# Patient Record
Sex: FEMALE | Race: WHITE
[De-identification: names, ages, dates, MRNs, and addresses within clinical notes are randomized per-mention and may not be internally consistent; named-entity substitution may affect disease eponyms.]

---

## 2018-05-20 ENCOUNTER — HOSPITAL ENCOUNTER (EMERGENCY)
Dept: HOSPITAL 17 - NEPD | Age: 33
Discharge: HOME | End: 2018-05-20
Payer: SELF-PAY

## 2018-05-20 VITALS
RESPIRATION RATE: 18 BRPM | DIASTOLIC BLOOD PRESSURE: 97 MMHG | OXYGEN SATURATION: 98 % | SYSTOLIC BLOOD PRESSURE: 141 MMHG | HEART RATE: 86 BPM | TEMPERATURE: 98.9 F

## 2018-05-20 VITALS — BODY MASS INDEX: 23.9 KG/M2 | WEIGHT: 139.99 LBS | HEIGHT: 64 IN

## 2018-05-20 DIAGNOSIS — I10: ICD-10-CM

## 2018-05-20 DIAGNOSIS — N93.9: Primary | ICD-10-CM

## 2018-05-20 LAB
ALBUMIN SERPL-MCNC: 3.6 GM/DL (ref 3.4–5)
ALP SERPL-CCNC: 111 U/L (ref 45–117)
ALT SERPL-CCNC: 40 U/L (ref 10–53)
AST SERPL-CCNC: 40 U/L (ref 15–37)
BASOPHILS # BLD AUTO: 0.1 TH/MM3 (ref 0–0.2)
BASOPHILS NFR BLD: 1.1 % (ref 0–2)
BILIRUB SERPL-MCNC: 0.4 MG/DL (ref 0.2–1)
BUN SERPL-MCNC: 16 MG/DL (ref 7–18)
CALCIUM SERPL-MCNC: 8.2 MG/DL (ref 8.5–10.1)
CHLORIDE SERPL-SCNC: 100 MEQ/L (ref 98–107)
COLOR UR: (no result)
CREAT SERPL-MCNC: 0.98 MG/DL (ref 0.5–1)
EOSINOPHIL # BLD: 0.2 TH/MM3 (ref 0–0.4)
EOSINOPHIL NFR BLD: 2.5 % (ref 0–4)
ERYTHROCYTE [DISTWIDTH] IN BLOOD BY AUTOMATED COUNT: 12.7 % (ref 11.6–17.2)
GFR SERPLBLD BASED ON 1.73 SQ M-ARVRAT: 65 ML/MIN (ref 89–?)
GLUCOSE SERPL-MCNC: 98 MG/DL (ref 74–106)
GLUCOSE UR STRIP-MCNC: (no result) MG/DL
HCO3 BLD-SCNC: 31.1 MEQ/L (ref 21–32)
HCT VFR BLD CALC: 41.1 % (ref 35–46)
HGB BLD-MCNC: 14.5 GM/DL (ref 11.6–15.3)
HGB UR QL STRIP: (no result)
KETONES UR STRIP-MCNC: (no result) MG/DL
LYMPHOCYTES # BLD AUTO: 2.5 TH/MM3 (ref 1–4.8)
LYMPHOCYTES NFR BLD AUTO: 39.6 % (ref 9–44)
MCH RBC QN AUTO: 32 PG (ref 27–34)
MCHC RBC AUTO-ENTMCNC: 35.2 % (ref 32–36)
MCV RBC AUTO: 90.8 FL (ref 80–100)
MONOCYTE #: 0.5 TH/MM3 (ref 0–0.9)
MONOCYTES NFR BLD: 7.3 % (ref 0–8)
NEUTROPHILS # BLD AUTO: 3.2 TH/MM3 (ref 1.8–7.7)
NEUTROPHILS NFR BLD AUTO: 49.5 % (ref 16–70)
NITRITE UR QL STRIP: (no result)
PLATELET # BLD: 261 TH/MM3 (ref 150–450)
PMV BLD AUTO: 9.1 FL (ref 7–11)
PROT SERPL-MCNC: 7.2 GM/DL (ref 6.4–8.2)
RBC # BLD AUTO: 4.53 MIL/MM3 (ref 4–5.3)
SODIUM SERPL-SCNC: 137 MEQ/L (ref 136–145)
SP GR UR STRIP: 1.02 (ref 1–1.03)
URINE LEUKOCYTE ESTERASE: (no result)
WBC # BLD AUTO: 6.4 TH/MM3 (ref 4–11)

## 2018-05-20 PROCEDURE — 99284 EMERGENCY DEPT VISIT MOD MDM: CPT

## 2018-05-20 PROCEDURE — 76856 US EXAM PELVIC COMPLETE: CPT

## 2018-05-20 PROCEDURE — 93975 VASCULAR STUDY: CPT

## 2018-05-20 PROCEDURE — 80053 COMPREHEN METABOLIC PANEL: CPT

## 2018-05-20 PROCEDURE — 81001 URINALYSIS AUTO W/SCOPE: CPT

## 2018-05-20 PROCEDURE — 76830 TRANSVAGINAL US NON-OB: CPT

## 2018-05-20 PROCEDURE — 85025 COMPLETE CBC W/AUTO DIFF WBC: CPT

## 2018-05-20 PROCEDURE — 84702 CHORIONIC GONADOTROPIN TEST: CPT

## 2018-05-20 NOTE — PD
HPI


Chief Complaint:  Gyn Problem/Complaint


Time Seen by Provider:  18:51


Travel History


International Travel<30 days:  No


Contact w/Intl Traveler<30days:  No


Traveled to known affect area:  No





History of Present Illness


HPI


32-year-old female arrives with complaint of vaginal bleeding for approximately 

1 day.  She woke up with vaginal bleeding.  She reports using multiple super 

max tampons every hour or so.  She reports large clots of blood that she can 

feel passing.  No similar prior episode has occurred.  No headache dizziness 

palpitations or chest pain.  No shortness of breath.  No fever.  No abdominal 

pelvic discomfort reported.  Timing continuous.





PFSH


Past Medical History


Hypertension:  Yes


Tetanus Vaccination:  < 5 Years


Pregnant?:  Unknown


LMP:  5/16/18





Social History


Alcohol Use:  Yes


Tobacco Use:  No


Substance Use:  No





Allergies-Medications


(Allergen,Severity, Reaction):  


Coded Allergies:  


     No Known Allergies (Unverified , 5/20/18)


Reported Meds & Prescriptions





Reported Meds & Active Scripts


Active


Medroxyprogesterone Acetate 5 Mg Tab 5 Mg PO DAILY


     Start day 16


Reported


Metoprolol Tartrate 50 Mg Tab 50 Mg PO DAILY


Hydrochlorothiazide 25 Mg Tab 25 Mg PO DAILY








Review of Systems


Except as stated in HPI:  all other systems reviewed are Neg


General / Constitutional:  No: Chills





Physical Exam


Narrative


GENERAL: 32-year-old female pleasant well-nourished well-developed





Vital Signs








  Date Time  Temp Pulse Resp B/P (MAP) Pulse Ox O2 Delivery O2 Flow Rate FiO2


 


5/20/18 18:35 98.9 86 18 141/97 (112) 98   








SKIN: Warm and dry.


HEAD: Atraumatic. Normocephalic. 


EYES: Pupils equal and round. No scleral icterus. No injection or drainage. 


ENT: No nasal bleeding or discharge.  Mucous membranes pink and moist.


NECK: Trachea midline. No JVD. 


CARDIOVASCULAR: Regular rate and rhythm.  


RESPIRATORY: No accessory muscle use. Clear to auscultation. Breath sounds 

equal bilaterally. 


GASTROINTESTINAL: Abdomen soft, non-tender, nondistended. Hepatic and splenic 

margins not palpable. 


MUSCULOSKELETAL: Extremities without clubbing, cyanosis, or edema. No obvious 

deformities. 


NEUROLOGICAL: Awake and alert. No obvious cranial nerve deficits.  Motor 

grossly within normal limits. Five out of 5 muscle strength in the arms and 

legs.  Normal speech.


PSYCHIATRIC: Appropriate mood and affect; insight and judgment normal.





Data


Data


Last Documented VS





Vital Signs








  Date Time  Temp Pulse Resp B/P (MAP) Pulse Ox O2 Delivery O2 Flow Rate FiO2


 


5/20/18 18:35 98.9 86 18 141/97 (112) 98   








Orders





 Orders


Beta Hcg (Quant/Titer) (5/20/18 19:16)


Complete Blood Count With Diff (5/20/18 19:16)


Comprehensive Metabolic Panel (5/20/18 19:16)


Urinalysis - C+S If Indicated (5/20/18 19:16)


Iv Access Insert/Monitor (5/20/18 19:16)


Ecg Monitoring (5/20/18 19:16)


Us Pelvis Comp W Dop Transvag (5/20/18 )


Ed Discharge Order (5/20/18 21:26)





Labs





Laboratory Tests








Test


  5/20/18


20:32 5/20/18


20:35


 


White Blood Count 6.4 TH/MM3  


 


Red Blood Count 4.53 MIL/MM3  


 


Hemoglobin 14.5 GM/DL  


 


Hematocrit 41.1 %  


 


Mean Corpuscular Volume 90.8 FL  


 


Mean Corpuscular Hemoglobin 32.0 PG  


 


Mean Corpuscular Hemoglobin


Concent 35.2 % 


  


 


 


Red Cell Distribution Width 12.7 %  


 


Platelet Count 261 TH/MM3  


 


Mean Platelet Volume 9.1 FL  


 


Neutrophils (%) (Auto) 49.5 %  


 


Lymphocytes (%) (Auto) 39.6 %  


 


Monocytes (%) (Auto) 7.3 %  


 


Eosinophils (%) (Auto) 2.5 %  


 


Basophils (%) (Auto) 1.1 %  


 


Neutrophils # (Auto) 3.2 TH/MM3  


 


Lymphocytes # (Auto) 2.5 TH/MM3  


 


Monocytes # (Auto) 0.5 TH/MM3  


 


Eosinophils # (Auto) 0.2 TH/MM3  


 


Basophils # (Auto) 0.1 TH/MM3  


 


CBC Comment DIFF FINAL  


 


Differential Comment   


 


Blood Urea Nitrogen 16 MG/DL  


 


Creatinine 0.98 MG/DL  


 


Random Glucose 98 MG/DL  


 


Total Protein 7.2 GM/DL  


 


Albumin 3.6 GM/DL  


 


Calcium Level 8.2 MG/DL  


 


Alkaline Phosphatase 111 U/L  


 


Aspartate Amino Transf


(AST/SGOT) 40 U/L 


  


 


 


Alanine Aminotransferase


(ALT/SGPT) 40 U/L 


  


 


 


Total Bilirubin 0.4 MG/DL  


 


Sodium Level 137 MEQ/L  


 


Potassium Level 4.0 MEQ/L  


 


Chloride Level 100 MEQ/L  


 


Carbon Dioxide Level 31.1 MEQ/L  


 


Anion Gap 6 MEQ/L  


 


Estimat Glomerular Filtration


Rate 65 ML/MIN 


  


 


 


Human Chorionic Gonadotropin,


Quant LESS THAN 1


MIU/ML 


 


 


Urine Color  LIGHT-YELLOW 


 


Urine Turbidity  CLEAR 


 


Urine pH  6.5 


 


Urine Specific Gravity  1.016 


 


Urine Protein  NEG mg/dL 


 


Urine Glucose (UA)  NEG mg/dL 


 


Urine Ketones  NEG mg/dL 


 


Urine Occult Blood  MOD 


 


Urine Nitrite  NEG 


 


Urine Bilirubin  NEG 


 


Urine Urobilinogen


  


  LESS THAN 2.0


MG/DL


 


Urine Leukocyte Esterase  NEG 


 


Urine RBC  3 /hpf 


 


Urine WBC


  


  LESS THAN 1


/hpf


 


Microscopic Urinalysis Comment


  


  CULT NOT


INDICATED











MDM


Medical Decision Making


Medical Screen Exam Complete:  Yes


Emergency Medical Condition:  Yes


Medical Record Reviewed:  Yes


Differential Diagnosis


Anemia, uterine mass, UTI


Narrative Course


CBC & BMP Diagram


5/20/18 20:32








Total Protein 7.2, Albumin 3.6, Calcium Level 8.2 L, Alkaline Phosphatase 111, 

Aspartate Amino Transf (AST/SGOT) 40 H, Alanine Aminotransferase (ALT/SGPT) 40, 

Total Bilirubin 0.4


Urinalysis shows no UTI





Vital Signs








  Date Time  Temp Pulse Resp B/P (MAP) Pulse Ox O2 Delivery O2 Flow Rate FiO2


 


5/20/18 18:35 98.9 86 18 141/97 (112) 98   





Patient is hemodynamically normal.


The patient is resting comfortably and feels better, is alert and in no 

distress the time of reassessment, 9:35 PM. The patients results and 

examination findings were discussed.  The repeat examination is unremarkable 

and benign. The history, exam, diagnostic testing, and current condition do not 

suggest any significant pathology to warrant further testing, continued ED 

treatment, admission, or surgical evaluation at this point. The vital signs 

have been stable. The patient does not have uncontrollable pain, intractable 

vomiting, or other significant symptoms. The patient's condition is stable and 

appropriate for discharge. The patient will pursue further outpatient 

evaluation with a primary care physician or other designated or consulting 

physician as indicated in the discharge instructions. The patient expressed 

understanding and was agreeable with this plan.





Diagnosis





 Primary Impression:  


 Vaginal bleeding


Referrals:  


Gynecologist


call for appointment


***Med/Other Pt SpecificInfo:  Prescription(s) given


Scripts


Medroxyprogesterone Acetate (Medroxyprogesterone Acetate) 5 Mg Tab


5 MG PO DAILY for Uterine bleeding, #10 TAB 0 Refills


   Start day 16


   Prov: Jostin Saldivar MD         5/20/18


Disposition:  01 DISCHARGE HOME


Condition:  Stable











Jostin Saldivar MD May 20, 2018 21:26

## 2018-05-20 NOTE — RADRPT
EXAM DATE/TIME:  05/20/2018 20:29 

 

[facility] COMPARISON:     

No previous studies available for comparison.

        

 

 

INDICATIONS :     

Pelvic pain. 

                     

 

MEDICAL HISTORY :     

Hypertension.     Pelvic pain. Alcohol use.

 

SURGICAL HISTORY :          

D&C. 

 

ENCOUNTER:     

Initial

 

ACUITY:     

1 day

 

PAIN SCORE:     

6/10

 

LOCATION:     

Bilateral pelvis 

MEASUREMENTS:     

                                                        

 

UTERUS:                                  

8.6 x 3.7 x 3.3 cm 

 

ENDOMETRIAL STRIPE:      

4 mm

 

RIGHT OVARY:                      

2.4 x 2.6 x 1.0 cm     

 

LEFT OVARY:                         

n/a. cm     

 

FINDINGS:     

 

UTERUS:     

Small cystic lesions measuring up to 5 mm in the lower uterine segment might reflect nabothian cysts.
 No definite myometrial mass.

 

RIGHT OVARY:     

Ovary contains no mass or significant cystic lesion.  

 

LEFT OVARY:     

Unable to visualize.

MISCELLANEOUS:     No free fluid.  

 

CONCLUSION:     

1. Left ovary is not visualized.

2. Normal appearing right ovary.

3. Probable small nabothian cysts. 

 

 

 Manjit Mata MD on May 20, 2018 at 21:17           

Board Certified Radiologist.

 This report was verified electronically.

## 2018-05-24 ENCOUNTER — HOSPITAL ENCOUNTER (EMERGENCY)
Dept: HOSPITAL 17 - NEPD | Age: 33
Discharge: HOME | End: 2018-05-24
Payer: SELF-PAY

## 2018-05-24 VITALS
HEART RATE: 82 BPM | RESPIRATION RATE: 22 BRPM | SYSTOLIC BLOOD PRESSURE: 135 MMHG | DIASTOLIC BLOOD PRESSURE: 67 MMHG | TEMPERATURE: 98.1 F | OXYGEN SATURATION: 100 %

## 2018-05-24 VITALS — DIASTOLIC BLOOD PRESSURE: 55 MMHG | SYSTOLIC BLOOD PRESSURE: 120 MMHG

## 2018-05-24 VITALS — WEIGHT: 145.51 LBS | HEIGHT: 64 IN | BODY MASS INDEX: 24.84 KG/M2

## 2018-05-24 DIAGNOSIS — Z79.899: ICD-10-CM

## 2018-05-24 DIAGNOSIS — N93.8: Primary | ICD-10-CM

## 2018-05-24 DIAGNOSIS — I10: ICD-10-CM

## 2018-05-24 LAB
ALBUMIN SERPL-MCNC: 3.7 GM/DL (ref 3.4–5)
ALP SERPL-CCNC: 84 U/L (ref 45–117)
ALT SERPL-CCNC: 41 U/L (ref 10–53)
AST SERPL-CCNC: 30 U/L (ref 15–37)
BACTERIA #/AREA URNS HPF: (no result) /HPF
BASOPHILS # BLD AUTO: 0.1 TH/MM3 (ref 0–0.2)
BASOPHILS NFR BLD: 0.9 % (ref 0–2)
BILIRUB SERPL-MCNC: 0.2 MG/DL (ref 0.2–1)
BUN SERPL-MCNC: 14 MG/DL (ref 7–18)
CALCIUM SERPL-MCNC: 8.7 MG/DL (ref 8.5–10.1)
CHLORIDE SERPL-SCNC: 101 MEQ/L (ref 98–107)
COLOR UR: (no result)
CREAT SERPL-MCNC: 0.92 MG/DL (ref 0.5–1)
EOSINOPHIL # BLD: 0.1 TH/MM3 (ref 0–0.4)
EOSINOPHIL NFR BLD: 2.2 % (ref 0–4)
ERYTHROCYTE [DISTWIDTH] IN BLOOD BY AUTOMATED COUNT: 12.6 % (ref 11.6–17.2)
GFR SERPLBLD BASED ON 1.73 SQ M-ARVRAT: 70 ML/MIN (ref 89–?)
GLUCOSE SERPL-MCNC: 94 MG/DL (ref 74–106)
GLUCOSE UR STRIP-MCNC: (no result) MG/DL
HCO3 BLD-SCNC: 31.9 MEQ/L (ref 21–32)
HCT VFR BLD CALC: 39.6 % (ref 35–46)
HGB BLD-MCNC: 13.8 GM/DL (ref 11.6–15.3)
HGB UR QL STRIP: (no result)
KETONES UR STRIP-MCNC: (no result) MG/DL
LYMPHOCYTES # BLD AUTO: 1.7 TH/MM3 (ref 1–4.8)
LYMPHOCYTES NFR BLD AUTO: 30.9 % (ref 9–44)
MCH RBC QN AUTO: 31.8 PG (ref 27–34)
MCHC RBC AUTO-ENTMCNC: 34.8 % (ref 32–36)
MCV RBC AUTO: 91.4 FL (ref 80–100)
MONOCYTE #: 0.4 TH/MM3 (ref 0–0.9)
MONOCYTES NFR BLD: 7.6 % (ref 0–8)
NEUTROPHILS # BLD AUTO: 3.3 TH/MM3 (ref 1.8–7.7)
NEUTROPHILS NFR BLD AUTO: 58.4 % (ref 16–70)
NITRITE UR QL STRIP: (no result)
PLATELET # BLD: 230 TH/MM3 (ref 150–450)
PMV BLD AUTO: 9 FL (ref 7–11)
PROT SERPL-MCNC: 7.3 GM/DL (ref 6.4–8.2)
RBC # BLD AUTO: 4.33 MIL/MM3 (ref 4–5.3)
SODIUM SERPL-SCNC: 141 MEQ/L (ref 136–145)
SP GR UR STRIP: 1.02 (ref 1–1.03)
SQUAMOUS #/AREA URNS HPF: 93 /HPF (ref 0–5)
URINE LEUKOCYTE ESTERASE: (no result)
WBC # BLD AUTO: 5.6 TH/MM3 (ref 4–11)

## 2018-05-24 PROCEDURE — 84703 CHORIONIC GONADOTROPIN ASSAY: CPT

## 2018-05-24 PROCEDURE — 80053 COMPREHEN METABOLIC PANEL: CPT

## 2018-05-24 PROCEDURE — 85025 COMPLETE CBC W/AUTO DIFF WBC: CPT

## 2018-05-24 PROCEDURE — 87086 URINE CULTURE/COLONY COUNT: CPT

## 2018-05-24 PROCEDURE — 99283 EMERGENCY DEPT VISIT LOW MDM: CPT

## 2018-05-24 PROCEDURE — 81001 URINALYSIS AUTO W/SCOPE: CPT

## 2018-05-24 NOTE — PD
HPI


Chief Complaint:  Bleeding


Time Seen by Provider:  13:21


Travel History


International Travel<30 days:  No


Contact w/Intl Traveler<30days:  No


Traveled to known affect area:  No





History of Present Illness


HPI


Patient is a 32-year-old female  presents emergency department for 

evaluation of continued vaginal bleeding.  Patient was here 2 days ago for 

similar, she was started on medroxyprogesterone for her vaginal bleeding and 

she states that it is making her dizzy and not relieving her bleeding at all.  

She states she has also been passing large clots.  States she had an ultrasound 

here 2 days ago which was negative, she is from out of town so has not followed 

up with an OB/GYN as of yet.  No fevers no abdominal pain no nausea no 

vomiting.  States symptoms are moderate, for the past week, constant, moderate.





PFSH


Past Medical History


Cardiovascular Problems:  Yes


Hypertension:  Yes


Tetanus Vaccination:  Unknown


Influenza Vaccination:  No


Pregnant?:  Not Pregnant


LMP:  18





Social History


Alcohol Use:  Yes


Tobacco Use:  No


Substance Use:  No





Allergies-Medications


(Allergen,Severity, Reaction):  


Coded Allergies:  


     No Known Allergies (Unverified , 18)


Reported Meds & Prescriptions





Reported Meds & Active Scripts


Active


Medroxyprogesterone Acetate 5 Mg Tab 5 Mg PO DAILY


     Start day 16


Reported


Metoprolol Tartrate 50 Mg Tab 50 Mg PO DAILY


Hydrochlorothiazide 25 Mg Tab 25 Mg PO DAILY








Review of Systems


Except as stated in HPI:  all other systems reviewed are Neg





Physical Exam


Narrative


GENERAL: Well-developed well-nourished in no obvious distress peer


SKIN: Focused skin assessment warm/dry.


HEAD: Atraumatic. Normocephalic. 


EYES: Pupils equal and round. No scleral icterus. No injection or drainage.  No 

conjunctival pallor


ENT: No nasal bleeding or discharge.  Mucous membranes pink and moist.


NECK: Trachea midline. No JVD. 


CARDIOVASCULAR: Regular rate and rhythm.  No murmur appreciated.


RESPIRATORY: No accessory muscle use. Clear to auscultation. Breath sounds 

equal bilaterally. 


GASTROINTESTINAL: Abdomen soft, non-tender, nondistended. Hepatic and splenic 

margins not palpable.


GENITOURINARY: Declined by patient


MUSCULOSKELETAL: No obvious deformities. No clubbing.  No cyanosis.  No edema. 


NEUROLOGICAL: Awake and alert. No obvious cranial nerve deficits.  Motor 

grossly within normal limits. Normal speech.


PSYCHIATRIC: Appropriate mood and affect; insight and judgment normal.





Data


Data


Last Documented VS





Vital Signs








  Date Time  Temp Pulse Resp B/P (MAP) Pulse Ox O2 Delivery O2 Flow Rate FiO2


 


18 14:01  80 16 120/55 (76) 100   


 


18 13:15      Room Air  


 


18 11:45 98.1       








Orders





 Orders


Complete Blood Count With Diff (18 11:47)


Comprehensive Metabolic Panel (18 11:47)


Urinalysis - C+S If Indicated (18 11:47)


Ed Urine Pregnancytest Poc (18 11:47)


Urine Culture (18 12:05)


Ed Discharge Order (18 13:30)





Labs





Laboratory Tests








Test


  18


12:05 18


12:23


 


Urine Color LIGHT-RED  


 


Urine Turbidity CLOUDY  


 


Urine pH 7.0  


 


Urine Specific Gravity 1.023  


 


Urine Protein 100 mg/dL  


 


Urine Glucose (UA) NEG mg/dL  


 


Urine Ketones NEG mg/dL  


 


Urine Occult Blood LARGE  


 


Urine Nitrite NEG  


 


Urine Bilirubin NEG  


 


Urine Urobilinogen


  LESS THAN 2.0


MG/DL 


 


 


Urine Leukocyte Esterase NEG  


 


Urine RBC  /hpf  


 


Urine WBC 18 /hpf  


 


Urine Squamous Epithelial


Cells 93 /hpf 


  


 


 


Urine Bacteria OCC /hpf  


 


Microscopic Urinalysis Comment


  CULTURE


INDICATED 


 


 


White Blood Count  5.6 TH/MM3 


 


Red Blood Count  4.33 MIL/MM3 


 


Hemoglobin  13.8 GM/DL 


 


Hematocrit  39.6 % 


 


Mean Corpuscular Volume  91.4 FL 


 


Mean Corpuscular Hemoglobin  31.8 PG 


 


Mean Corpuscular Hemoglobin


Concent 


  34.8 % 


 


 


Red Cell Distribution Width  12.6 % 


 


Platelet Count  230 TH/MM3 


 


Mean Platelet Volume  9.0 FL 


 


Neutrophils (%) (Auto)  58.4 % 


 


Lymphocytes (%) (Auto)  30.9 % 


 


Monocytes (%) (Auto)  7.6 % 


 


Eosinophils (%) (Auto)  2.2 % 


 


Basophils (%) (Auto)  0.9 % 


 


Neutrophils # (Auto)  3.3 TH/MM3 


 


Lymphocytes # (Auto)  1.7 TH/MM3 


 


Monocytes # (Auto)  0.4 TH/MM3 


 


Eosinophils # (Auto)  0.1 TH/MM3 


 


Basophils # (Auto)  0.1 TH/MM3 


 


CBC Comment  DIFF FINAL 


 


Differential Comment   


 


Blood Urea Nitrogen  14 MG/DL 


 


Creatinine  0.92 MG/DL 


 


Random Glucose  94 MG/DL 


 


Total Protein  7.3 GM/DL 


 


Albumin  3.7 GM/DL 


 


Calcium Level  8.7 MG/DL 


 


Alkaline Phosphatase  84 U/L 


 


Aspartate Amino Transf


(AST/SGOT) 


  30 U/L 


 


 


Alanine Aminotransferase


(ALT/SGPT) 


  41 U/L 


 


 


Total Bilirubin  0.2 MG/DL 


 


Sodium Level  141 MEQ/L 


 


Potassium Level  4.4 MEQ/L 


 


Chloride Level  101 MEQ/L 


 


Carbon Dioxide Level  31.9 MEQ/L 


 


Anion Gap  8 MEQ/L 


 


Estimat Glomerular Filtration


Rate 


  70 ML/MIN 


 











MDM


Medical Decision Making


Medical Screen Exam Complete:  Yes


Emergency Medical Condition:  Yes


Differential Diagnosis


Dysfunctional uterine bleeding, pregnancy, anemia.


Narrative Course


Patient room to the emergency department, labs are sent from triage as part of 

protocol, hemoglobin 13.8, pregnancy test negative.  Reviewed her ultrasound 

yesterday other than a poorly visualized left ovary it was negative.  Patient 

has a benign abdominal exam, she was offered pelvic exam declined, at this 

point there is no indication further workup of this patient.  Discussed that 

she does need to follow-up with an OB/GYN, I did have a discussion with her 

mother who is in the room as well, she had a hysterectomy at similar age for 

similar symptoms.  Discussed this may be in the patient's future as well.  At 

this time I discussed that there is no indication further workup.  Patient is 

stable for discharge





Diagnosis





 Primary Impression:  


 DUB (dysfunctional uterine bleeding)


Referrals:  


Jacqueline Simon MD


Disposition:  01 DISCHARGE HOME


Condition:  Stable











Hay Rao MD May 24, 2018 13:29